# Patient Record
Sex: MALE | Race: BLACK OR AFRICAN AMERICAN | NOT HISPANIC OR LATINO | Employment: FULL TIME | URBAN - NONMETROPOLITAN AREA
[De-identification: names, ages, dates, MRNs, and addresses within clinical notes are randomized per-mention and may not be internally consistent; named-entity substitution may affect disease eponyms.]

---

## 2019-06-19 ENCOUNTER — HOSPITAL ENCOUNTER (EMERGENCY)
Facility: HOSPITAL | Age: 54
Discharge: HOME | End: 2019-06-19
Attending: EMERGENCY MEDICINE | Admitting: EMERGENCY MEDICINE

## 2019-06-19 ENCOUNTER — APPOINTMENT (OUTPATIENT)
Dept: CT IMAGING | Facility: HOSPITAL | Age: 54
End: 2019-06-19

## 2019-06-19 VITALS
BODY MASS INDEX: 36.1 KG/M2 | TEMPERATURE: 98 F | DIASTOLIC BLOOD PRESSURE: 65 MMHG | HEART RATE: 61 BPM | SYSTOLIC BLOOD PRESSURE: 111 MMHG | RESPIRATION RATE: 15 BRPM | WEIGHT: 312 LBS | HEIGHT: 78 IN | OXYGEN SATURATION: 98 %

## 2019-06-19 DIAGNOSIS — G89.29 CHRONIC MIDLINE LOW BACK PAIN WITH LEFT-SIDED SCIATICA: ICD-10-CM

## 2019-06-19 DIAGNOSIS — S33.5XXA LUMBAR SPRAIN, INITIAL ENCOUNTER: Primary | ICD-10-CM

## 2019-06-19 DIAGNOSIS — M54.42 CHRONIC MIDLINE LOW BACK PAIN WITH LEFT-SIDED SCIATICA: ICD-10-CM

## 2019-06-19 PROCEDURE — 72131 CT LUMBAR SPINE W/O DYE: CPT

## 2019-06-19 PROCEDURE — 25010000002 KETOROLAC TROMETHAMINE PER 15 MG: Performed by: PHYSICIAN ASSISTANT

## 2019-06-19 PROCEDURE — 96372 THER/PROPH/DIAG INJ SC/IM: CPT

## 2019-06-19 PROCEDURE — 99284 EMERGENCY DEPT VISIT MOD MDM: CPT

## 2019-06-19 PROCEDURE — 25010000002 ORPHENADRINE CITRATE PER 60 MG: Performed by: PHYSICIAN ASSISTANT

## 2019-06-19 RX ORDER — FEXOFENADINE HCL 180 MG/1
180 TABLET ORAL DAILY
COMMUNITY

## 2019-06-19 RX ORDER — ACETAMINOPHEN 500 MG
500 TABLET ORAL EVERY 6 HOURS PRN
COMMUNITY

## 2019-06-19 RX ORDER — ORPHENADRINE CITRATE 30 MG/ML
60 INJECTION INTRAMUSCULAR; INTRAVENOUS EVERY 12 HOURS
Status: DISCONTINUED | OUTPATIENT
Start: 2019-06-19 | End: 2019-06-19 | Stop reason: HOSPADM

## 2019-06-19 RX ORDER — OMEPRAZOLE 40 MG/1
40 CAPSULE, DELAYED RELEASE ORAL DAILY
COMMUNITY

## 2019-06-19 RX ORDER — PREDNISONE 20 MG/1
20 TABLET ORAL DAILY
COMMUNITY
End: 2023-01-09

## 2019-06-19 RX ORDER — KETOROLAC TROMETHAMINE 30 MG/ML
30 INJECTION, SOLUTION INTRAMUSCULAR; INTRAVENOUS ONCE
Status: COMPLETED | OUTPATIENT
Start: 2019-06-19 | End: 2019-06-19

## 2019-06-19 RX ORDER — MONTELUKAST SODIUM 10 MG/1
10 TABLET ORAL NIGHTLY
COMMUNITY

## 2019-06-19 RX ORDER — GABAPENTIN 600 MG/1
600 TABLET ORAL 3 TIMES DAILY
COMMUNITY

## 2019-06-19 RX ADMIN — ORPHENADRINE CITRATE 60 MG: 30 INJECTION INTRAMUSCULAR; INTRAVENOUS at 11:07

## 2019-06-19 RX ADMIN — KETOROLAC TROMETHAMINE 30 MG: 30 INJECTION, SOLUTION INTRAMUSCULAR at 12:51

## 2021-04-21 ENCOUNTER — IMMUNIZATION (OUTPATIENT)
Dept: VACCINE CLINIC | Facility: HOSPITAL | Age: 56
End: 2021-04-21

## 2021-04-21 PROCEDURE — 91300 HC SARSCOV02 VAC 30MCG/0.3ML IM: CPT | Performed by: INTERNAL MEDICINE

## 2021-04-21 PROCEDURE — 0001A: CPT | Performed by: INTERNAL MEDICINE

## 2021-05-13 ENCOUNTER — IMMUNIZATION (OUTPATIENT)
Dept: VACCINE CLINIC | Facility: HOSPITAL | Age: 56
End: 2021-05-13

## 2021-05-13 PROCEDURE — 0002A: CPT | Performed by: INTERNAL MEDICINE

## 2021-05-13 PROCEDURE — 91300 HC SARSCOV02 VAC 30MCG/0.3ML IM: CPT | Performed by: INTERNAL MEDICINE

## 2024-09-20 ENCOUNTER — HOSPITAL ENCOUNTER (OUTPATIENT)
Dept: OTHER | Facility: HOSPITAL | Age: 59
Discharge: HOME | End: 2024-09-20

## 2024-11-12 ENCOUNTER — OFFICE VISIT (OUTPATIENT)
Dept: NEUROSURGERY | Facility: CLINIC | Age: 59
End: 2024-11-12
Payer: OTHER GOVERNMENT

## 2024-11-12 ENCOUNTER — PATIENT ROUNDING (BHMG ONLY) (OUTPATIENT)
Dept: NEUROSURGERY | Facility: CLINIC | Age: 59
End: 2024-11-12
Payer: OTHER GOVERNMENT

## 2024-11-12 VITALS
HEIGHT: 78 IN | DIASTOLIC BLOOD PRESSURE: 79 MMHG | BODY MASS INDEX: 33.09 KG/M2 | SYSTOLIC BLOOD PRESSURE: 126 MMHG | WEIGHT: 286 LBS | HEART RATE: 76 BPM

## 2024-11-12 DIAGNOSIS — M47.812 CERVICAL SPONDYLOSIS WITHOUT MYELOPATHY: Primary | ICD-10-CM

## 2024-11-12 DIAGNOSIS — M48.02 SPINAL STENOSIS IN CERVICAL REGION: ICD-10-CM

## 2024-11-12 PROCEDURE — 99214 OFFICE O/P EST MOD 30 MIN: CPT | Performed by: NURSE PRACTITIONER

## 2024-11-12 NOTE — PROGRESS NOTES
Chief Complaint  Neck Pain    Subjective          John Eugene who is a 59 y.o. year old male who presents to [facility] for evaluation of cervical spine.    History of Present Illness  The patient is a 59-year-old male presenting for neck pain.    He reports experiencing soreness in his neck, which has been managed through chiropractic adjustments. He rates his current neck pain as 3 out of 10. The pain is generally stable, with occasional flare-ups. He does not experience any pain radiating down to his arms or fingers. He mentions an itch at his throat and cough but reports no difficulty swallowing or feeling of obstruction when swallowing pills or with food. He has been taking iodine tablets, prescribed by his chiropractor, which have reduced his coughing.    An MRI of his neck was ordered by a neurologist who was treating him for migraines. The neurologist had previously conducted a brain scan and noticed some abnormalities near his neck. The imaging incidentally showed an enlarged thyroid with nodules. He has not had an ultrasound of the thyroid.     He has undergone two back surgeries, one in 2010 and another in 2011, both related to his service in the [employer]. The first surgery involved shaving a disc that was pressing against nerves, and the second involved removing two bones from his spine to alleviate hip pain. He still experiences some pain but is able to walk normally. He has stopped lifting weights and now focuses on stretching exercises. He has been advised to use a rowing machine and stationary bike for exercise.     Was this the result of an injury or accident? : No    History of Previous Spinal Surgery?: Yes.  Lumbar, Date 2010 and 2011    Nicotine use: non-smoker    BMI: Body mass index is 32.22 kg/m².      Review of Systems   Musculoskeletal:  Positive for arthralgias, back pain, myalgias, neck pain and neck stiffness.   All other systems reviewed and  "are negative.       Objective   Vital Signs:   /79 (BP Location: Left arm, Patient Position: Sitting)   Pulse 76   Ht 200.7 cm (79\")   Wt 130 kg (286 lb)   BMI 32.22 kg/m²       Physical Exam  Vitals reviewed.   Constitutional:       Appearance: Normal appearance.   Musculoskeletal:      Cervical back: No tenderness. No pain with movement. Normal range of motion.   Neurological:      Mental Status: He is alert.      Motor: Motor strength is normal.     Deep Tendon Reflexes:      Reflex Scores:       Tricep reflexes are 2+ on the right side and 2+ on the left side.       Bicep reflexes are 2+ on the right side and 2+ on the left side.       Brachioradialis reflexes are 2+ on the right side and 2+ on the left side.       Neurological Exam  Mental Status  Alert.    Motor   Strength is 5/5 throughout all four extremities.    Sensory  Sensation is intact to light touch, pinprick, vibration and proprioception in all four extremities.    Reflexes                                            Right                      Left  Brachioradialis                    2+                         2+  Biceps                                 2+                         2+  Triceps                                2+                         2+    Right pathological reflexes: Benitez's absent.  Left pathological reflexes: Benitez's absent.    Gait  Casual gait is normal including stance, stride, and arm swing.      Physical Exam         Result Review :       Data reviewed : Radiologic studies XR Cervical Spine on 11/10/23 at [facility] demonstrates multilevel degenerative changes.         [facility], demonstrating multilevel spondylosis combined with congenitally short pedicles resulting in multilevel spinal stenosis, most significant at C3/4, C4/5, and C5/6 where there is moderate spinal stenosis.      Assessment and Plan    Diagnoses and all orders for this visit:    1. Cervical " spondylosis without myelopathy (Primary)    2. Spinal stenosis in cervical region        Assessment & Plan  1. Cervical spine degenerative changes.  The MRI reveals degenerative changes in the cervical spine, leading to spinal canal narrowing. This is likely due to wear and tear over time, possibly exacerbated by his active  service. There is no evidence of significant bone spurring on the vertebrae that would indent towards the esophagus or tracheal region contributing to his cough concerns.     He was advised to avoid strenuous heavy lifting or jarring activities that could potentially cause further degeneration or herniation of the discs, leading to increased pain or complications. While chiropractic treatment is acceptable, caution was recommended against forceful neck manipulation. Traction, stretching, and massage were suggested as beneficial. Should he experience any changes or new concerns, he is encouraged to contact us.    2. Enlarged thyroid with nodules.  The MRI incidentally noted an enlarged thyroid with some nodules. An ultrasound was recommended to ensure there are no concerns in the area of the enlarged thyroid and nodules. Referral to an ear, nose, and throat doctor or an endocrinologist was suggested for further evaluation.           Follow Up   Return if symptoms worsen or fail to improve.  Patient was given instructions and counseling regarding his condition or for health maintenance advice.     Patient or patient representative verbalized consent for the use of Ambient Listening during the visit with  ASAEL Rodrigez for chart documentation. 11/12/2024  16:05 EST

## 2024-12-23 ENCOUNTER — OFFICE VISIT (OUTPATIENT)
Dept: SLEEP MEDICINE | Facility: HOSPITAL | Age: 59
End: 2024-12-23
Payer: OTHER GOVERNMENT

## 2024-12-23 VITALS
WEIGHT: 286 LBS | BODY MASS INDEX: 33.09 KG/M2 | HEART RATE: 66 BPM | SYSTOLIC BLOOD PRESSURE: 117 MMHG | DIASTOLIC BLOOD PRESSURE: 74 MMHG | OXYGEN SATURATION: 96 % | HEIGHT: 78 IN

## 2024-12-23 DIAGNOSIS — E66.811 CLASS 1 OBESITY WITH SERIOUS COMORBIDITY AND BODY MASS INDEX (BMI) OF 32.0 TO 32.9 IN ADULT, UNSPECIFIED OBESITY TYPE: ICD-10-CM

## 2024-12-23 DIAGNOSIS — R06.83 SNORING: ICD-10-CM

## 2024-12-23 DIAGNOSIS — F32.A DEPRESSION, UNSPECIFIED DEPRESSION TYPE: ICD-10-CM

## 2024-12-23 DIAGNOSIS — I10 ESSENTIAL HYPERTENSION: ICD-10-CM

## 2024-12-23 DIAGNOSIS — R29.818 SUSPECTED SLEEP APNEA: Primary | ICD-10-CM

## 2024-12-23 DIAGNOSIS — Z72.821 INADEQUATE SLEEP HYGIENE: ICD-10-CM

## 2024-12-23 DIAGNOSIS — R53.83 OTHER FATIGUE: ICD-10-CM

## 2024-12-23 DIAGNOSIS — R06.81 WITNESSED EPISODE OF APNEA: ICD-10-CM

## 2024-12-23 PROCEDURE — G0463 HOSPITAL OUTPT CLINIC VISIT: HCPCS

## 2024-12-23 NOTE — PROGRESS NOTES
[address] [place] [id number]  Phone: [phone]  Fax: [phone]    John Eugene  [id number]   1965  59 y.o.  male      Referring physician/provider Jonelle Whitt APRN    Type of service: Initial Sleep Medicine Consult.  Date of service: 12/23/2024      Chief Complaint   Patient presents with    Snoring    Witnessed Apnea       History of present illness;  The patient was seen today on 12/23/2024 at [facility].    Thank you for asking to see John Eugene, 59 y.o. PMHx HTN, depression, NIDDM, chronic back pain, lumbosacral disc disease (x2 back surgeries in June 2010 and July 20211), sickle cell trait, migraines, GERD, obesity.  The patient presents for initial evaluation of sleep disordered breathing.  Patient  denies prior surgery namely tonsillectomy, nasal surgery or UPPP.       [facility]**    Snoring and apneic events noted by his wife in the past  He did lose some weight and believes it has improved mainly was in supine position in the past   States his neurologist treating him for migraines was concerned about sleep apnea      Obstructive Sleep Apnea Screening: STOP-BANG Sleep Apnea Questionnaire. Reference: Bereket F et al. Br J Anaesth, 2012.     Criterion    Yes    No  Do you SNORE loudly?   [x]   Yes  []   No   Do you often feel TIRED, fatigued, or sleepy during the day?    [x]   Yes  []   No  +fatigue epworth normal 6/24  Has anyone OBSERVED you stop breathing during your sleep?    [x]   Yes  []   No  Do you have or are you being treated for high blood PRESSURE?    [x]   Yes  []   No  BMI >32 kg/m2     [x]   Yes  []   No  AGE > 50 years    [x]   Yes  []   No  NECK circumference >16 inches / 40 cm    [x]   Yes  []   No  GENDER: male     [x]   Yes  []   No    LETICIA Probability:  []   1-2 - Low  []   3-4 - Intermediate  [x]   5-8 - High    Save what  is noted above in HPI, denies any OTHER past known:  cardiopulmonary conditions/neurologic disorders/neuromuscular disorders  Never needed supplemental O2 at home  Denies any opioid therapy  Denies any metal in head/neck/chest      Further Sleep History:    Bedtime: 9:30 PM weekdays.  Weekends 10 PM  Rise Time: Weekdays 6:15 AM weekends 8 AM  Sleep Latency: Less than 20 minutes up to 60 minutes  Screens in bed: Yes  Wake after sleep onset: Twice  Reasons for awakenings: Nocturia or apneic events  Number of naps per day up to 1/day  Naps restorative: No  Caffeine use: 1 beverage per day    RLS Symptoms: No   Bruxism:No   Current sleep related gastroesophageal reflux symptoms:  No   Cataplexy:  No   Sleep Paralysis:  No   Hypnagogic or hypnopompic hallucinations: No   Parasomnias such as sleep walking or sleep eating No     Disclaimer Sleep History: The above sleep history is based on this sleep physician's in room encounter with the patient. Pre encounter self administered questionnaires are taken into consideration and discussed with patient for any discordance. The above documentation by this sleep physician is the most accurate clinical information determined by in room sleep physician encounter with patient.     MEDICAL CONDITIONS (PMH)   HTN  Depression  NIDDM  Chronic back pain  Lumbosacral disc disease  Obesity  GERD  Migraine    Social history:  Do you drive a commercial vehicle:  No   Shift work:  No   Tobacco use:  No   Alcohol use: 0 per week  Occupation: Rehabilitation Counselor    Family Hx (parents and siblings) (pertaining to sleep medicine)  Obesity    Medications: reviewed    Review of systems is negative unless otherwise noted per HPI   Disclaimer History: The above history is based on this sleep physician's in room encounter with the patient. Pre encounter self administered questionnaires are taken into consideration and discussed with patient for any discordance. The above documentation by this  "sleep physician is the most accurate clinical information determined by in room sleep physician encounter with patient.     Physical exam:  Vitals:    12/23/24 1500   BP: 117/74   Pulse: 66   SpO2: 96%   Weight: 130 kg (286 lb)   Height: 200.7 cm (79\")    Body mass index is 32.22 kg/m².   CONSTITUTIONAL:  Non-toxic, In no overt distress   Head: normocephalic   ENT: Mallampati class IV, + macroglossia, no septal defects   NECK:Neck Circumference: 18 inches,no nuchal rigidity  RESPIRATORY SYSTEM: Breath sounds are clear (no rales, no rhonchi, no wheezes), no accessory muscle use  CARDIOVASULAR SYSTEM: Heart sounds are regular rhythm and normal rate, no rub, no gallop, no edema  NEUROLOGICAL SYSTEM: Oriented x 3, No gross focal deficits   PSYCHIATRIC SYSTEM: Goal oriented, affect full range appropriate          **[facility]**  Assessment and plan:  Suspected sleep apnea [R29.818]/Other fatigue patient's symptoms and examination is consistent with sleep apnea (G47.30). I have talked to the patient about the signs and symptoms of sleep apnea. In addition, I have also discussed pathophysiology of sleep apnea.  I also discussed the complications of untreated sleep apnea including effects on hypertension, diabetes mellitus and nonrestorative sleep with hypersomnia which can increase risk for motor vehicle accidents.  Untreated sleep apnea is also a risk factor for development of atrial fibrillation, hypertension, insulin resistance and cerebrovascular accident.  Discussed in detail of various testing methods including home-based and lab based sleep studies.  Based on history and physical examination and other comorbidities the most appropriate study is Home Sleep Study.  The order for the sleep study is placed in [facility].  The test will be scheduled after approval from insurance. Treatment and management will be discussed after the test is completed. Patient was " given opportunity to ask questions and all the questions were answered.     He stated was initially reluctant for evaluation, by the end of the visit he verbatim stated this visit was very informative and he looks forward to testing and if treatment determined necessary would look forward and agreeable to PAP therapy.    **[facility] determined [facility]**  -Patient made aware I will not fill out any [employer] / "[employer] forms-letters for service connection. This must be done by the patient's [employer] physicians who may refer to my diagnosis and may make the the determination of service connection based on their evaluation and final determination. This is not an appropriate request to a care in the community non-[facility] physician. Counseled the patient must follow up with a [facility] physician, VA compensation and pension physician, an alternative may be an occupational health physician that they must discuss with their [facility] physician.      Snoring (R06.83), snoring is the sound created by turbulent airflow vibrating upper airway soft tissue due to limitation of inspiratory airflow. I have also discussed factors affecting snoring including sleep deprivation, sleeping on the back and alcohol ingestion. To minimize snoring, patient is advised to have adequate sleep, sleep on the side and avoid alcohol and sedative medications before bedtime  Other fatigue, If sleep study negative counseled to follow up with PCP to advance fatigue workup.  Obesity, patient's BMI is Body mass index is 32.22 kg/m².. I have discussed the relationship between weight and sleep apnea.There is direct correlation between weight and severity of sleep apnea.  Weight reduction is encouraged, as it is going to reduce the severity of sleep apnea. I have also discussed with the patient diet and exercise to achieve ideal body weight.  Inadequate sleep hygiene [Z72.821]   Counseled the patient lifestyle modifications as below to be applied especially night of any sleep study, the patient verbalized understanding of same. Follow up with PCP to reinforce my counseling towards healthy lifestyle modifications if sleep studies are negative.  HTN,  Follow up with primary care physician for continued management. This medical condition would make the patient eligible for a trial of PAP therapy even if sleep study reveals mild severity sleep apnea.  Depression, Follow up with primary care physician for continued management. Comorbid mood disorders would make the patient eligible for a trial of PAP therapy even if sleep study reveals mild severity sleep apnea.      I have also discussed with the patient the following  Sleep hygiene: Maintaining a regular bedtime and wake time, not to watch television or work in bed, limit caffeine-containing beverages before bed time and avoid naps during the day  Adequate amount of sleep.  Generally most people needs about 7 to 8 hours of sleep.       Patient's questions were answered      I once again thank you for asking me to see this patient in consultation and I have forwarded my opinion and treatment plan.  Please do not hesitate to call me if you have any questions.       EMR Dragon/Transcription disclaimer:   Much of this encounter note is an electronic transcription/translation of spoken language to printed text. The electronic translation of spoken language may permit erroneous, or at times, nonsensical words or phrases to be inadvertently transcribed; Although I have reviewed the note for such errors, some may still exist.     NPI #: [id number]    Fabiana Ivan, DO  Sleep Medicine  [facility]  12/23/24

## 2025-01-08 ENCOUNTER — HOSPITAL ENCOUNTER (OUTPATIENT)
Dept: SLEEP MEDICINE | Facility: HOSPITAL | Age: 60
Discharge: HOME | End: 2025-01-08
Admitting: FAMILY MEDICINE
Payer: OTHER GOVERNMENT

## 2025-01-08 DIAGNOSIS — R06.83 SNORING: ICD-10-CM

## 2025-01-08 DIAGNOSIS — R06.81 WITNESSED EPISODE OF APNEA: ICD-10-CM

## 2025-01-08 DIAGNOSIS — R53.83 OTHER FATIGUE: ICD-10-CM

## 2025-01-08 DIAGNOSIS — R29.818 SUSPECTED SLEEP APNEA: ICD-10-CM

## 2025-01-08 PROCEDURE — G0399 HOME SLEEP TEST/TYPE 3 PORTA: HCPCS

## 2025-01-13 PROCEDURE — 95806 SLEEP STUDY UNATT&RESP EFFT: CPT | Performed by: FAMILY MEDICINE

## 2025-01-15 ENCOUNTER — TELEPHONE (OUTPATIENT)
Dept: SLEEP MEDICINE | Facility: HOSPITAL | Age: 60
End: 2025-01-15
Payer: OTHER GOVERNMENT

## 2025-01-21 ENCOUNTER — TELEPHONE (OUTPATIENT)
Dept: SLEEP MEDICINE | Facility: HOSPITAL | Age: 60
End: 2025-01-21
Payer: OTHER GOVERNMENT

## 2025-03-07 ENCOUNTER — TELEPHONE (OUTPATIENT)
Dept: SLEEP MEDICINE | Facility: HOSPITAL | Age: 60
End: 2025-03-07
Payer: OTHER GOVERNMENT

## 2025-03-07 NOTE — TELEPHONE ENCOUNTER
Received voice message from Jonelle VYAS to call [phone] ext [phone] in regard to a referred patient, no name or date of birth given. Called and spoke with her in regard to patient she stated we had sent the patient sleep study to [facility] and she needed a copy as she was the referring provider. I asked for the patient name and date of birth, she said it was in the voice mail, I explained I did not have the patients name of date of birth. She asked if I would give her the information over the phone, explained that I was not a medical professional and that I was not comfortable doing that. I attempted to explain that I would fax over and request she stated they had sent several requests over and they had not been fulfilled. I attempted to explain that the requests for medical records would go to HIM[facility] at [facility] and tried to give her the number and that we do not handle in this office, she would not allow me to finish stating that as a provider she doesn't normally have to call and request records. She gave me the fax number [phone]. Faxed successfully

## 2025-03-19 ENCOUNTER — TELEPHONE (OUTPATIENT)
Dept: SLEEP MEDICINE | Facility: HOSPITAL | Age: 60
End: 2025-03-19
Payer: OTHER GOVERNMENT

## 2025-03-21 ENCOUNTER — TELEPHONE (OUTPATIENT)
Dept: SLEEP MEDICINE | Facility: HOSPITAL | Age: 60
End: 2025-03-21
Payer: OTHER GOVERNMENT

## 2025-03-27 ENCOUNTER — TELEPHONE (OUTPATIENT)
Dept: SLEEP MEDICINE | Facility: HOSPITAL | Age: 60
End: 2025-03-27
Payer: OTHER GOVERNMENT